# Patient Record
Sex: MALE | Race: WHITE | NOT HISPANIC OR LATINO | URBAN - METROPOLITAN AREA
[De-identification: names, ages, dates, MRNs, and addresses within clinical notes are randomized per-mention and may not be internally consistent; named-entity substitution may affect disease eponyms.]

---

## 2023-01-05 ENCOUNTER — OFFICE VISIT (OUTPATIENT)
Dept: URBAN - METROPOLITAN AREA CLINIC 30 | Facility: CLINIC | Age: 83
End: 2023-01-05
Payer: COMMERCIAL

## 2023-01-05 DIAGNOSIS — B02.33 HERPES ZOSTER KERATOCONJUNCTIVITIS: Primary | ICD-10-CM

## 2023-01-05 PROCEDURE — 99204 OFFICE O/P NEW MOD 45 MIN: CPT

## 2023-01-05 RX ORDER — VALACYCLOVIR HYDROCHLORIDE 1 G/1
TABLET, FILM COATED ORAL
Qty: 30 | Refills: 1 | Status: ACTIVE
Start: 2023-01-05

## 2023-01-05 RX ORDER — OFLOXACIN 3 MG/ML
0.3 % SOLUTION/ DROPS OPHTHALMIC
Qty: 5 | Refills: 1 | Status: ACTIVE
Start: 2023-01-05

## 2023-01-05 NOTE — IMPRESSION/PLAN
Impression: Herpes zoster keratoconjunctivitis: B02.33.
- Shingles OD with blepharoconjunctivitis component Plan: Pt diagnosed with Shingles x 2 weeks ago, finished course of Famciclovir as directed 500mg TID as well as 20mg oral Prednisone x 5 days. No eye drops. Reports ocular involvement x 10 days. No corneal involvement. Dilated exam reveals no posterior involvement. PLAN:
Begin Valcyclovir 1000mg TID x 10 days. Begin Ofloxacin QID. Return 4-5 days [facility]. **Patient daughters name is Renato Stewart, phone number ([phone]). Continue to update her throughout future visits via telephone.  Spoke to her this afternoon at 3:30pm.

## 2023-01-09 ENCOUNTER — OFFICE VISIT (OUTPATIENT)
Dept: URBAN - METROPOLITAN AREA CLINIC 30 | Facility: CLINIC | Age: 83
End: 2023-01-09
Payer: COMMERCIAL

## 2023-01-09 DIAGNOSIS — B02.33 HERPES ZOSTER KERATOCONJUNCTIVITIS: Primary | ICD-10-CM

## 2023-01-09 PROCEDURE — 99214 OFFICE O/P EST MOD 30 MIN: CPT

## 2023-01-09 RX ORDER — VALACYCLOVIR HYDROCHLORIDE 1 G/1
TABLET, FILM COATED ORAL
Qty: 30 | Refills: 2 | Status: ACTIVE
Start: 2023-01-09

## 2023-01-09 RX ORDER — PREDNISOLONE ACETATE 10 MG/ML
1 % SUSPENSION/ DROPS OPHTHALMIC
Qty: 5 | Refills: 2 | Status: ACTIVE
Start: 2023-01-09

## 2023-01-09 ASSESSMENT — INTRAOCULAR PRESSURE
OD: 23
OS: 19

## 2023-01-09 NOTE — IMPRESSION/PLAN
Impression: Herpes zoster keratoconjunctivitis: B02.33.
- Shingles OD with blepharoconjunctivitis component Plan: Pt diagnosed with Shingles x 2 weeks ago, finished course of Famciclovir as directed 500mg TID as well as 20mg oral Prednisone x 5 days. No eye drops. Reports ocular involvement x 10 days. No corneal involvement. Dilated exam reveals no posterior involvement 1/9/23. PLAN:
Continue Valcyclovir 1000mg TID x 10 days. Continue Ofloxacin QID OD. Begin Pred QID OD. Return 3-4 days [facility] **Patient daughters name is Aicha Schwarzkenyatta, phone number ([phone]). Continue to update her throughout future visits via telephone.  Spoke to her this afternoon at 3:30pm.

## 2023-01-17 ENCOUNTER — OFFICE VISIT (OUTPATIENT)
Dept: URBAN - METROPOLITAN AREA CLINIC 30 | Facility: CLINIC | Age: 83
End: 2023-01-17
Payer: COMMERCIAL

## 2023-01-17 DIAGNOSIS — B02.33 HERPES ZOSTER KERATOCONJUNCTIVITIS: Primary | ICD-10-CM

## 2023-01-17 PROCEDURE — 99214 OFFICE O/P EST MOD 30 MIN: CPT

## 2023-01-17 RX ORDER — VALACYCLOVIR HYDROCHLORIDE 1 G/1
TABLET, FILM COATED ORAL
Qty: 42 | Refills: 2 | Status: ACTIVE
Start: 2023-01-17

## 2023-01-17 RX ORDER — NEOMYCIN SULFATE, POLYMYXIN B SULFATE AND DEXAMETHASONE 3.5; 10000; 1 MG/G; [USP'U]/G; MG/G
OINTMENT OPHTHALMIC
Qty: 3.5 | Refills: 2 | Status: ACTIVE
Start: 2023-01-17

## 2023-01-17 ASSESSMENT — INTRAOCULAR PRESSURE
OS: 23
OD: 23

## 2023-01-17 NOTE — IMPRESSION/PLAN
Impression: Herpes zoster keratoconjunctivitis: B02.33.
- Shingles OD with blepharoconjunctivitis component Plan: Persistent ocular surface involvement and lid thickening. No corneal involvement. Dilated exam reveals no posterior involvement 1/9/23. PLAN:
Begin Valcyclovir 1000mg TID x 10 days (pt never picked up). Begin Maxitrol stanton BID to upper eyelid Continue PF TID
D/c Ofloxacin 1-2 weeks follow up **Patient daughters name is Nighat Arthur, phone number ([phone]). Continue to update her throughout future visits via telephone.

## 2023-01-24 ENCOUNTER — OFFICE VISIT (OUTPATIENT)
Dept: URBAN - METROPOLITAN AREA CLINIC 30 | Facility: CLINIC | Age: 83
End: 2023-01-24
Payer: COMMERCIAL

## 2023-01-24 DIAGNOSIS — B02.33 HERPES ZOSTER KERATOCONJUNCTIVITIS: Primary | ICD-10-CM

## 2023-01-24 PROCEDURE — 99213 OFFICE O/P EST LOW 20 MIN: CPT

## 2023-01-24 ASSESSMENT — INTRAOCULAR PRESSURE
OD: 21
OS: 25

## 2023-01-24 NOTE — IMPRESSION/PLAN
Impression: Herpes zoster keratoconjunctivitis: B02.33.
- Shingles OD with blepharoconjunctivitis component Plan: Persistent lid thickening, ocular surface is improving. No corneal involvement. Dilated exam reveals no posterior involvement 1/9/23. PLAN:
Begin Valcyclovir 1000mg TID x 10 days (pt still never picked up). Continue Maxitrol stanton BID to upper eyelid Continue PF TID

2 weeks follow up **Patient daughters name is Maci Rondon, phone number ([phone]). Continue to update her throughout future visits via telephone.

## 2023-02-07 ENCOUNTER — OFFICE VISIT (OUTPATIENT)
Dept: URBAN - METROPOLITAN AREA CLINIC 30 | Facility: CLINIC | Age: 83
End: 2023-02-07
Payer: COMMERCIAL

## 2023-02-07 DIAGNOSIS — B02.33 HERPES ZOSTER KERATOCONJUNCTIVITIS: Primary | ICD-10-CM

## 2023-02-07 PROCEDURE — 99213 OFFICE O/P EST LOW 20 MIN: CPT

## 2023-02-07 ASSESSMENT — INTRAOCULAR PRESSURE
OS: 27
OD: 27

## 2023-02-07 NOTE — IMPRESSION/PLAN
Impression: Herpes zoster keratoconjunctivitis: B02.33.
- Shingles OD with blepharoconjunctivitis component Plan: Significant surface and lid improvement today. No corneal involvement. Dilated exam reveals no posterior involvement 1/9/23. Patient still never picked up Valtrex, also ran out of gtts two days ago. Ocular health significantly improved but pt still with significant periorbital and forehead localized pain. 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 Will send back to PCP. PLAN:
Valcyclovir 1000mg TID x 10 days (pt still never picked up). Continue Maxitrol stanton BID to upper eyelid D/c Pred, Ofloxacin To see PCP for post-herpetic neuralgia 

1 month follow up **Patient daughters name is Cecilia Sena, phone number [phone]). Continue to update her throughout future visits via telephone.

## 2023-03-07 ENCOUNTER — OFFICE VISIT (OUTPATIENT)
Dept: URBAN - METROPOLITAN AREA CLINIC 30 | Facility: CLINIC | Age: 83
End: 2023-03-07
Payer: COMMERCIAL

## 2023-03-07 DIAGNOSIS — B02.33 HERPES ZOSTER KERATOCONJUNCTIVITIS: Primary | ICD-10-CM

## 2023-03-07 PROCEDURE — 99214 OFFICE O/P EST MOD 30 MIN: CPT

## 2023-03-07 ASSESSMENT — INTRAOCULAR PRESSURE
OS: 19
OD: 27

## 2023-03-07 NOTE — IMPRESSION/PLAN
Impression: Herpes zoster keratoconjunctivitis: B02.33.
- Shingles OD with blepharoconjunctivitis component Plan: Mild injection returned today, ran out of stanton last week. Patient still never picked up Valtrex. Pt still with significant periorbital and forehead localized pain. 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 No improvement yet per pt. Will follow up with PCP next week, may need med adjustment. PLAN:
Continue Maxitrol stanton BID to upper eyelid Maxitrol gtt TID OD x 1 week **Patient daughters name is Noreen Litten, phone number ([phone]). Continue to update her throughout future visits via telephone.

## 2023-04-20 ENCOUNTER — OFFICE VISIT (OUTPATIENT)
Dept: URBAN - METROPOLITAN AREA CLINIC 30 | Facility: CLINIC | Age: 83
End: 2023-04-20
Payer: COMMERCIAL

## 2023-04-20 DIAGNOSIS — B02.33 HERPES ZOSTER KERATOCONJUNCTIVITIS: Primary | ICD-10-CM

## 2023-04-20 PROCEDURE — 99213 OFFICE O/P EST LOW 20 MIN: CPT

## 2023-04-20 ASSESSMENT — INTRAOCULAR PRESSURE
OD: 22
OS: 19
OS: 23

## 2023-04-20 NOTE — IMPRESSION/PLAN
Impression: Herpes zoster keratoconjunctivitis: B02.33.
- Shingles OD with blepharoconjunctivitis component Plan: Pt still with significant periorbital and forehead localized pain - however, reports some improvement today. Likely post-herpetic neuralgia, saw PCP 2 mo ago and began Gabapentin. Will follow up with PCP, may need med adjustment. PLAN:
Can d/c Maxitrol gtt + stanton. Follow up with PCP. F/u 3-4 mo [facility]

## 2023-12-05 ENCOUNTER — OFFICE VISIT (OUTPATIENT)
Dept: URBAN - METROPOLITAN AREA CLINIC 30 | Facility: CLINIC | Age: 83
End: 2023-12-05
Payer: MEDICARE

## 2023-12-05 DIAGNOSIS — H04.123 DRY EYE SYNDROME OF BILATERAL LACRIMAL GLANDS: Primary | ICD-10-CM

## 2023-12-05 PROCEDURE — 99213 OFFICE O/P EST LOW 20 MIN: CPT

## 2023-12-05 PROCEDURE — 92133 CPTRZD OPH DX IMG PST SGM ON: CPT

## 2023-12-05 ASSESSMENT — KERATOMETRY
OS: 42.52
OD: 43.17

## 2023-12-05 ASSESSMENT — VISUAL ACUITY
OS: 20/20
OD: 20/50

## 2023-12-05 ASSESSMENT — INTRAOCULAR PRESSURE
OS: 19
OD: 28

## 2024-01-31 ENCOUNTER — OFFICE VISIT (OUTPATIENT)
Dept: URBAN - METROPOLITAN AREA CLINIC 30 | Facility: CLINIC | Age: 84
End: 2024-01-31
Payer: MEDICARE

## 2024-01-31 DIAGNOSIS — B02.33 HERPES ZOSTER KERATOCONJUNCTIVITIS: ICD-10-CM

## 2024-01-31 DIAGNOSIS — H40.051 OCULAR HYPERTENSION, RIGHT EYE: Primary | ICD-10-CM

## 2024-01-31 PROCEDURE — 92083 EXTENDED VISUAL FIELD XM: CPT

## 2024-01-31 PROCEDURE — 99213 OFFICE O/P EST LOW 20 MIN: CPT

## 2024-01-31 ASSESSMENT — INTRAOCULAR PRESSURE
OS: 20
OD: 24

## 2024-01-31 ASSESSMENT — VISUAL ACUITY
OD: 20/60
OS: 20/25

## 2024-01-31 ASSESSMENT — KERATOMETRY
OD: 42.95
OS: 42.61